# Patient Record
Sex: MALE | Race: WHITE | NOT HISPANIC OR LATINO | URBAN - METROPOLITAN AREA
[De-identification: names, ages, dates, MRNs, and addresses within clinical notes are randomized per-mention and may not be internally consistent; named-entity substitution may affect disease eponyms.]

---

## 2023-11-25 ENCOUNTER — HOSPITAL ENCOUNTER (EMERGENCY)
Facility: HOSPITAL | Age: 23
Discharge: HOME | End: 2023-11-25
Attending: EMERGENCY MEDICINE

## 2023-11-25 VITALS
DIASTOLIC BLOOD PRESSURE: 88 MMHG | HEIGHT: 74 IN | SYSTOLIC BLOOD PRESSURE: 129 MMHG | RESPIRATION RATE: 20 BRPM | WEIGHT: 171.31 LBS | TEMPERATURE: 98 F | BODY MASS INDEX: 21.98 KG/M2 | HEART RATE: 64 BPM | OXYGEN SATURATION: 98 %

## 2023-11-25 DIAGNOSIS — K82.8 GALLBLADDER SLUDGE: Primary | ICD-10-CM

## 2023-11-25 DIAGNOSIS — R10.11 RIGHT UPPER QUADRANT ABDOMINAL PAIN: ICD-10-CM

## 2023-11-25 DIAGNOSIS — R11.2 NAUSEA AND VOMITING, UNSPECIFIED VOMITING TYPE: ICD-10-CM

## 2023-11-25 DIAGNOSIS — R03.0 ELEVATED BLOOD PRESSURE READING WITHOUT DIAGNOSIS OF HYPERTENSION: ICD-10-CM

## 2023-11-25 LAB
ALBUMIN SERPL BCP-MCNC: 5.1 G/DL (ref 3.5–5.2)
ALP SERPL-CCNC: 54 U/L (ref 55–135)
ALT SERPL W/O P-5'-P-CCNC: 18 U/L (ref 10–44)
ANION GAP SERPL CALC-SCNC: 13 MMOL/L (ref 8–16)
AST SERPL-CCNC: 20 U/L (ref 10–40)
BASOPHILS # BLD AUTO: 0.03 K/UL (ref 0–0.2)
BASOPHILS NFR BLD: 0.3 % (ref 0–1.9)
BILIRUB SERPL-MCNC: 1.5 MG/DL (ref 0.1–1)
BUN SERPL-MCNC: 7 MG/DL (ref 6–20)
CALCIUM SERPL-MCNC: 10.3 MG/DL (ref 8.7–10.5)
CHLORIDE SERPL-SCNC: 101 MMOL/L (ref 95–110)
CO2 SERPL-SCNC: 28 MMOL/L (ref 23–29)
CREAT SERPL-MCNC: 0.9 MG/DL (ref 0.5–1.4)
DIFFERENTIAL METHOD: ABNORMAL
EOSINOPHIL # BLD AUTO: 0.2 K/UL (ref 0–0.5)
EOSINOPHIL NFR BLD: 2.3 % (ref 0–8)
ERYTHROCYTE [DISTWIDTH] IN BLOOD BY AUTOMATED COUNT: 11.8 % (ref 11.5–14.5)
EST. GFR  (NO RACE VARIABLE): >60 ML/MIN/1.73 M^2
GLUCOSE SERPL-MCNC: 120 MG/DL (ref 70–110)
HCT VFR BLD AUTO: 47.3 % (ref 40–54)
HGB BLD-MCNC: 16.8 G/DL (ref 14–18)
IMM GRANULOCYTES # BLD AUTO: 0.02 K/UL (ref 0–0.04)
IMM GRANULOCYTES NFR BLD AUTO: 0.2 % (ref 0–0.5)
LIPASE SERPL-CCNC: 19 U/L (ref 4–60)
LYMPHOCYTES # BLD AUTO: 1.5 K/UL (ref 1–4.8)
LYMPHOCYTES NFR BLD: 15.9 % (ref 18–48)
MAGNESIUM SERPL-MCNC: 1.9 MG/DL (ref 1.6–2.6)
MCH RBC QN AUTO: 30.3 PG (ref 27–31)
MCHC RBC AUTO-ENTMCNC: 35.5 G/DL (ref 32–36)
MCV RBC AUTO: 85 FL (ref 82–98)
MONOCYTES # BLD AUTO: 0.6 K/UL (ref 0.3–1)
MONOCYTES NFR BLD: 6.3 % (ref 4–15)
NEUTROPHILS # BLD AUTO: 6.9 K/UL (ref 1.8–7.7)
NEUTROPHILS NFR BLD: 75 % (ref 38–73)
NRBC BLD-RTO: 0 /100 WBC
PLATELET # BLD AUTO: 177 K/UL (ref 150–450)
PMV BLD AUTO: 10 FL (ref 9.2–12.9)
POTASSIUM SERPL-SCNC: 3.4 MMOL/L (ref 3.5–5.1)
PROT SERPL-MCNC: 8.2 G/DL (ref 6–8.4)
RBC # BLD AUTO: 5.54 M/UL (ref 4.6–6.2)
SODIUM SERPL-SCNC: 142 MMOL/L (ref 136–145)
WBC # BLD AUTO: 9.25 K/UL (ref 3.9–12.7)

## 2023-11-25 PROCEDURE — 96375 TX/PRO/DX INJ NEW DRUG ADDON: CPT

## 2023-11-25 PROCEDURE — 96376 TX/PRO/DX INJ SAME DRUG ADON: CPT

## 2023-11-25 PROCEDURE — 85025 COMPLETE CBC W/AUTO DIFF WBC: CPT | Performed by: EMERGENCY MEDICINE

## 2023-11-25 PROCEDURE — 83735 ASSAY OF MAGNESIUM: CPT | Performed by: EMERGENCY MEDICINE

## 2023-11-25 PROCEDURE — 80053 COMPREHEN METABOLIC PANEL: CPT | Performed by: EMERGENCY MEDICINE

## 2023-11-25 PROCEDURE — 25000003 PHARM REV CODE 250: Performed by: EMERGENCY MEDICINE

## 2023-11-25 PROCEDURE — 83690 ASSAY OF LIPASE: CPT | Performed by: EMERGENCY MEDICINE

## 2023-11-25 PROCEDURE — 63600175 PHARM REV CODE 636 W HCPCS: Performed by: EMERGENCY MEDICINE

## 2023-11-25 PROCEDURE — 99284 EMERGENCY DEPT VISIT MOD MDM: CPT | Mod: 25

## 2023-11-25 PROCEDURE — 96374 THER/PROPH/DIAG INJ IV PUSH: CPT

## 2023-11-25 RX ORDER — MAG HYDROX/ALUMINUM HYD/SIMETH 200-200-20
30 SUSPENSION, ORAL (FINAL DOSE FORM) ORAL ONCE
Status: COMPLETED | OUTPATIENT
Start: 2023-11-25 | End: 2023-11-25

## 2023-11-25 RX ORDER — ONDANSETRON 2 MG/ML
4 INJECTION INTRAMUSCULAR; INTRAVENOUS
Status: COMPLETED | OUTPATIENT
Start: 2023-11-25 | End: 2023-11-25

## 2023-11-25 RX ORDER — LIDOCAINE HYDROCHLORIDE 20 MG/ML
15 SOLUTION OROPHARYNGEAL ONCE
Status: DISCONTINUED | OUTPATIENT
Start: 2023-11-25 | End: 2023-11-25 | Stop reason: HOSPADM

## 2023-11-25 RX ORDER — ONDANSETRON 2 MG/ML
4 INJECTION INTRAMUSCULAR; INTRAVENOUS
Status: DISCONTINUED | OUTPATIENT
Start: 2023-11-25 | End: 2023-11-25 | Stop reason: HOSPADM

## 2023-11-25 RX ORDER — SODIUM CHLORIDE 9 MG/ML
INJECTION, SOLUTION INTRAVENOUS CONTINUOUS
Status: DISCONTINUED | OUTPATIENT
Start: 2023-11-25 | End: 2023-11-25

## 2023-11-25 RX ORDER — MORPHINE SULFATE 4 MG/ML
4 INJECTION, SOLUTION INTRAMUSCULAR; INTRAVENOUS
Status: COMPLETED | OUTPATIENT
Start: 2023-11-25 | End: 2023-11-25

## 2023-11-25 RX ORDER — HYDROCODONE BITARTRATE AND ACETAMINOPHEN 10; 325 MG/1; MG/1
1 TABLET ORAL
Status: COMPLETED | OUTPATIENT
Start: 2023-11-25 | End: 2023-11-25

## 2023-11-25 RX ORDER — ONDANSETRON 4 MG/1
4 TABLET, ORALLY DISINTEGRATING ORAL EVERY 8 HOURS PRN
Qty: 10 TABLET | Refills: 0 | Status: SHIPPED | OUTPATIENT
Start: 2023-11-25

## 2023-11-25 RX ORDER — HYDROCODONE BITARTRATE AND ACETAMINOPHEN 5; 325 MG/1; MG/1
1 TABLET ORAL EVERY 4 HOURS PRN
Qty: 12 TABLET | Refills: 0 | Status: SHIPPED | OUTPATIENT
Start: 2023-11-25

## 2023-11-25 RX ADMIN — MORPHINE SULFATE 4 MG: 4 INJECTION INTRAVENOUS at 02:11

## 2023-11-25 RX ADMIN — ALUMINUM HYDROXIDE, MAGNESIUM HYDROXIDE, AND SIMETHICONE 30 ML: 200; 200; 20 SUSPENSION ORAL at 06:11

## 2023-11-25 RX ADMIN — MORPHINE SULFATE 4 MG: 4 INJECTION INTRAVENOUS at 07:11

## 2023-11-25 RX ADMIN — HYDROCODONE BITARTRATE AND ACETAMINOPHEN 1 TABLET: 10; 325 TABLET ORAL at 08:11

## 2023-11-25 RX ADMIN — MORPHINE SULFATE 4 MG: 4 INJECTION INTRAVENOUS at 09:11

## 2023-11-25 RX ADMIN — POTASSIUM BICARBONATE 20 MEQ: 391 TABLET, EFFERVESCENT ORAL at 09:11

## 2023-11-25 RX ADMIN — ONDANSETRON 4 MG: 2 INJECTION INTRAMUSCULAR; INTRAVENOUS at 06:11

## 2023-11-25 NOTE — ED NOTES
Called again to check ETA of pt's MRCP: 15min. Pt. C/o pain (RUQ) and requesting pain medication - MD notified, will address new order.

## 2023-11-25 NOTE — ED NOTES
Pt. C/o severe RUQ pain. MD notified. New order received and addressed (MAR). Family member at BS. Denies further needs at this time. WCTM.

## 2023-11-25 NOTE — ED NOTES
"Called MRI - no answer. Called main radiology desk to make sure that MRI had been notified about MRCP for this pt. [facility] tech states, "They already have him moved over in the computer, so without question, they know about it." Pt. Resting quietly on stretcher in NAD. VSS, resp. E/u. Eyes closed, but arouses easily to voice. Denies needs. WCTM.  "

## 2023-11-25 NOTE — ED NOTES
Video  services utilized (ID: [id number]) to explain MRCP procedure to pt. And reason for procedure. Pt. Verbalizes understanding and consent to have procedure.

## 2023-11-25 NOTE — ED PROVIDER NOTES
SCRIBE #1 NOTE: I, Jojo Jett, am scribing for, and in the presence of, Adilson Lenz MD. I have scribed the HPI, ROS, and PEx.     SCRIBE #2 NOTE: I, Jaclyn Aguirre, am scribing for, and in the presence of,  Sam Lee MD. I have scribed the remaining portions of the note not scribed by Scribe #1.     SCRIBE #3 NOTE: I, Edie Waldo, am scribing for, and in the presence of, Keila Goodman DO. I have scribed the remaining portions of the note not scribed by Scribe #2.      History     Chief Complaint   Patient presents with    Abdominal Pain     Review of patient's allergies indicates:  No Known Allergies      History of Present Illness     HPI    11/25/2023, 5:52 AM  History obtained from the patient      History of Present Illness: Kaci Sood is a 23 y.o. male patient who presents to the Emergency Department for evaluation of epigastric abd pain which onset 2 days PTA. Pt says that he ate fried chicken the night before waking up with epigastric abd pain. Symptoms are constant and moderate in severity. No mitigating or exacerbating factors reported. Associated sxs include a subjective fever, chills, and sleep disturbances. Patient denies any N/V/D, CP, SOB, HA, and all other sxs at this time. No prior Tx. Pt occasionally drinks EtOH. No further complaints or concerns at this time.       Arrival mode: Personal vehicle     PCP: No primary care provider on file.        Past Medical History:  No past medical history on file.    Past Surgical History:  No past surgical history on file.      Family History:  No family history on file.    Social History:  Social History     Tobacco Use    Smoking status: Not on file    Smokeless tobacco: Not on file   Substance and Sexual Activity    Alcohol use: Not on file    Drug use: Not on file    Sexual activity: Not on file        Review of Systems     Review of Systems   Constitutional:  Positive for chills and fever (subjective).   HENT:   Negative for sore throat.    Respiratory:  Negative for shortness of breath.    Cardiovascular:  Negative for chest pain.   Gastrointestinal:  Positive for abdominal pain (epigastric). Negative for diarrhea, nausea and vomiting.   Genitourinary:  Negative for dysuria.   Musculoskeletal:  Negative for back pain.   Skin:  Negative for rash.   Neurological:  Negative for weakness and headaches.   Hematological:  Does not bruise/bleed easily.   Psychiatric/Behavioral:  Positive for sleep disturbance.    All other systems reviewed and are negative.     Physical Exam     Initial Vitals [11/25/23 0525]   BP Pulse Resp Temp SpO2   (!) 138/102 100 18 98.2 °F (36.8 °C) 100 %      MAP       --          Physical Exam  Nursing Notes and Vital Signs Reviewed.  Constitutional: Patient is in no acute distress. Well-developed and well-nourished.  Head: Atraumatic. Normocephalic.  Eyes: PERRL. EOM intact. Conjunctivae are not pale. No scleral icterus.  ENT: Mucous membranes are moist.   Neck: Supple. Full ROM. No lymphadenopathy.  Cardiovascular: Regular rate. Regular rhythm. No murmurs, rubs, or gallops. Distal pulses are 2+ and symmetric.  Pulmonary/Chest: No respiratory distress. Clear to auscultation bilaterally. No wheezing or rales.  Abdominal: Soft and non-distended.  There is no tenderness.  No rebound, guarding, or rigidity.   Genitourinary: No CVA tenderness  Musculoskeletal: Moves all extremities. No obvious deformities. No edema.   Skin: Warm and dry.  Neurological:  Alert, awake, and appropriate.  Normal speech.  No acute focal neurological deficits are appreciated.  Psychiatric: Normal affect. Good eye contact. Appropriate in content.     ED Course   Procedures  ED Vital Signs:  Vitals:    11/25/23 0903 11/25/23 0905 11/25/23 0920 11/25/23 0932   BP: 114/65   126/70   Pulse: 63  60 (!) 58   Resp:  20 16    Temp:       TempSrc:       SpO2: 95%      Weight:       Height:        11/25/23 1032 11/25/23 1132 11/25/23 1202  11/25/23 1232   BP: 118/65 130/73 128/79 116/69   Pulse: (!) 56 (!) 57 60 (!) 58   Resp: 16      Temp:       TempSrc:       SpO2: 98% 96% 97% 95%   Weight:       Height:        11/25/23 1302 11/25/23 1448 11/25/23 1510 11/25/23 1802   BP: 119/78 129/73  118/60   Pulse: 61 75 66 62   Resp: 16 18 16 16   Temp:   98 °F (36.7 °C)    TempSrc:   Oral    SpO2: 97% 99%  98%   Weight:       Height:        11/25/23 1833 11/25/23 1900 11/2001   BP: 139/86 129/88    Pulse: 63 64    Resp:   20   Temp:      TempSrc:      SpO2: 97% 98%    Weight:      Height:          Abnormal Lab Results:  Labs Reviewed   CBC W/ AUTO DIFFERENTIAL - Abnormal; Notable for the following components:       Result Value    Gran % 75.0 (*)     Lymph % 15.9 (*)     All other components within normal limits   COMPREHENSIVE METABOLIC PANEL - Abnormal; Notable for the following components:    Potassium 3.4 (*)     Glucose 120 (*)     Total Bilirubin 1.5 (*)     Alkaline Phosphatase 54 (*)     All other components within normal limits   LIPASE   MAGNESIUM   MAGNESIUM        All Lab Results:  Results for orders placed or performed during the hospital encounter of 11/25/23   CBC auto differential   Result Value Ref Range    WBC 9.25 3.90 - 12.70 K/uL    RBC 5.54 4.60 - 6.20 M/uL    Hemoglobin 16.8 14.0 - 18.0 g/dL    Hematocrit 47.3 40.0 - 54.0 %    MCV 85 82 - 98 fL    MCH 30.3 27.0 - 31.0 pg    MCHC 35.5 32.0 - 36.0 g/dL    RDW 11.8 11.5 - 14.5 %    Platelets 177 150 - 450 K/uL    MPV 10.0 9.2 - 12.9 fL    Immature Granulocytes 0.2 0.0 - 0.5 %    Gran # (ANC) 6.9 1.8 - 7.7 K/uL    Immature Grans (Abs) 0.02 0.00 - 0.04 K/uL    Lymph # 1.5 1.0 - 4.8 K/uL    Mono # 0.6 0.3 - 1.0 K/uL    Eos # 0.2 0.0 - 0.5 K/uL    Baso # 0.03 0.00 - 0.20 K/uL    nRBC 0 0 /100 WBC    Gran % 75.0 (H) 38.0 - 73.0 %    Lymph % 15.9 (L) 18.0 - 48.0 %    Mono % 6.3 4.0 - 15.0 %    Eosinophil % 2.3 0.0 - 8.0 %    Basophil % 0.3 0.0 - 1.9 %    Differential Method Automated     Comprehensive metabolic panel   Result Value Ref Range    Sodium 142 136 - 145 mmol/L    Potassium 3.4 (L) 3.5 - 5.1 mmol/L    Chloride 101 95 - 110 mmol/L    CO2 28 23 - 29 mmol/L    Glucose 120 (H) 70 - 110 mg/dL    BUN 7 6 - 20 mg/dL    Creatinine 0.9 0.5 - 1.4 mg/dL    Calcium 10.3 8.7 - 10.5 mg/dL    Total Protein 8.2 6.0 - 8.4 g/dL    Albumin 5.1 3.5 - 5.2 g/dL    Total Bilirubin 1.5 (H) 0.1 - 1.0 mg/dL    Alkaline Phosphatase 54 (L) 55 - 135 U/L    AST 20 10 - 40 U/L    ALT 18 10 - 44 U/L    eGFR >60 >60 mL/min/1.73 m^2    Anion Gap 13 8 - 16 mmol/L   Lipase   Result Value Ref Range    Lipase 19 4 - 60 U/L   Magnesium   Result Value Ref Range    Magnesium 1.9 1.6 - 2.6 mg/dL        Imaging Results:  Imaging Results              MRI MRCP Without Contrast (Final result)  Result time 11/25/23 18:26:21      Final result by Sterling Galvan MD (11/25/23 18:26:21)                   Impression:      Normal MRCP.    Finalized on: 11/25/2023 6:26 PM By:  SHERRI Galvan MD, MD  [facility]# [id number]      2023-11-25 18:28:32.402    [facility]               Narrative:    EXAM: MRI ABDOMEN WITHOUT CONTRAST MRCP    CLINICAL HISTORY: Biliary obstruction.    TECHNIQUE: Multiplanar, multisequence MR imaging was performed through the abdomen without intravenous contrast.  Two-dimensional and three-dimensional time-of-flight imaging was performed through the biliary tree.    COMPARISON:  None available    FINDINGS:    No biliary ductal dilatation.  No intraductal filling defects.  No ascites.  The gallbladder is grossly normal.                                         US Abdomen Limited (Final result)  Result time 11/25/23 08:08:45      Final result by Eliecer Harman III, MD (11/25/23 08:08:45)                   Impression:      Gallbladder sludge.      Electronically signed by: Joey Harman  Date:    11/25/2023  Time:    08:08               Narrative:    EXAMINATION:  US ABDOMEN LIMITED    CLINICAL HISTORY:  Abdominal pain,  elevated bili;    TECHNIQUE:  Limited ultrasound of the right upper quadrant of the abdomen (including pancreas, liver, gallbladder, common bile duct, and spleen) was performed.    COMPARISON:  None.    FINDINGS:  Liver: Normal in size, measuring 16.1 cm. Homogeneous echotexture. No focal hepatic lesions.    Gallbladder: Gallbladder sludge.  No stones identified.  No gallbladder wall thickening or pericholecystic fluid.  No sonographic Lundberg sign.    Biliary system: The common duct is not dilated, measuring 3 mm.  No intrahepatic ductal dilatation.    Miscellaneous: No free fluid.  The visualized pancreas, IVC and right kidney are unremarkable.  The right kidney measures 10.5 cm.  Hepatopetal flow within the portal vein.                                                The Emergency Provider reviewed the vital signs and test results, which are outlined above.     ED Discussion     6:00 AM: Dr. Lenz transfers care of patient to Dr. Lee pending lab results.    9:14 AM: Discussed pt's case with Dr. Kirby Lam MD (Bariatrics) who recommends having pt call his office to schedule repeat labs, and to order MRCP.     4:00 PM: Dr. Lee transfers care of patient to Dr. Goodman.    6:35 PM: Reassessed pt at this time. Pain and nausea are controlled.     7:20 PM: Discussed pt's case with Dr. Kirby Lam MD (General Surgery) who states that pt does not have signs of acute cholecystitis and that pt should be able to f/u outpatient to further discuss cholecystectomy.     7:20 PM: Reassessed pt at this time. Pt is okay with discharge with symptom control and follow-up. Discussed with pt all pertinent ED information and results. Discussed pt dx and plan of tx. Gave pt all f/u and return to the ED instructions. All questions and concerns were addressed at this time. Pt expresses understanding of information and instructions, and is comfortable with plan to discharge. Pt is stable for discharge.    I discussed with patient  and/or family/caretaker that evaluation in the ED does not suggest any emergent or life threatening medical conditions requiring immediate intervention beyond what was provided in the ED, and I believe patient is safe for discharge.  Regardless, an unremarkable evaluation in the ED does not preclude the development or presence of a serious of life threatening condition. As such, patient was instructed to return immediately for any worsening or change in current symptoms.        ED Course as of 11/26/23 0056   Sat Nov 25, 2023   1630 MRI MRCP Without Contrast  Pending  [NF]   1834 MRCP normal [NF]      ED Course User Index  [NF] Keila Goodman, DO     Medical Decision Making  23-year-old male presents with epigastric abdominal pain after eating fried chicken.  He has a mild increase in his total bilirubin but otherwise his LFTs are normal.  He is no fever or leukocytosis.  Lipase is negative for acute pancreatitis.  He is mild hypokalemia with otherwise normal electrolytes and renal function.  Ultrasound shows gallbladder sludge.  MRCP shows no signs of choledocholithiasis.  Pain is well-controlled and he is tolerating oral intake.      Amount and/or Complexity of Data Reviewed  Labs: ordered. Decision-making details documented in ED Course.  Radiology: ordered. Decision-making details documented in ED Course.    Risk  OTC drugs.  Prescription drug management.  Parenteral controlled substances.  Decision regarding hospitalization.  Minor surgery with no identified risk factors.                ED Medication(s):  Medications   aluminum-magnesium hydroxide-simethicone 200-200-20 mg/5 mL suspension 30 mL (30 mLs Oral Given 11/25/23 0614)   ondansetron injection 4 mg (4 mg Intravenous Given 11/25/23 0625)   morphine injection 4 mg (4 mg Intravenous Given 11/25/23 0708)   morphine injection 4 mg (4 mg Intravenous Given 11/25/23 0905)   potassium bicarbonate disintegrating tablet 20 mEq (20 mEq Oral Given 11/25/23 0907)    morphine injection 4 mg (4 mg Intravenous Given 11/25/23 1448)   HYDROcodone-acetaminophen  mg per tablet 1 tablet (1 tablet Oral Given 11/2001)       Discharge Medication List as of 11/25/2023  7:20 PM        START taking these medications    Details   HYDROcodone-acetaminophen (NORCO) 5-325 mg per tablet Take 1 tablet by mouth every 4 (four) hours as needed for Pain., Starting Sat 11/25/2023, Print      ondansetron (ZOFRAN-ODT) 4 MG TbDL Take 1 tablet (4 mg total) by mouth every 8 (eight) hours as needed (Nausea)., Starting Sat 11/25/2023, Print              Follow-up Information       [facility] Emergency [facility]    Specialty: Emergency Medicine  Why: As needed, If symptoms worsen  Contact information:  [id number] [facility] [address] [phone]                               Scribe Attestation:   Scribe #1: I performed the above scribed service and the documentation accurately describes the services I performed. I attest to the accuracy of the note.     Attending:   Physician Attestation Statement for Scribe #1: I, Adilson Lenz MD, personally performed the services described in this documentation, as scribed by Jojo Jett, in my presence, and it is both accurate and complete.       Scribe Attestation:   Scribe #2: I performed the above scribed service and the documentation accurately describes the services I performed. I attest to the accuracy of the note.  Scribe #3: I performed the above scribed service and the documentation accurately describes the services I performed. I attest to the accuracy of the note.    Attending Attestation:           Physician Attestation for Scribe:    Physician Attestation Statement for Scribe #2: I, Sam Lee MD, reviewed documentation, as scribed by Jaclyn Aguirre in my presence, and it is both accurate and complete. I also acknowledge and confirm the content of the note done by Scribe #1.      Attending:   Physician  Attestation Statement for Scribe #3: I, Keila Goodman DO, personally performed the services described in this documentation, as scribed by Edie Haas, in my presence, and it is both accurate and complete.        Clinical Impression       ICD-10-CM ICD-9-CM   1. Gallbladder sludge  K82.8 575.8   2. Nausea and vomiting, unspecified vomiting type  R11.2 787.01   3. Elevated blood pressure reading without diagnosis of hypertension  R03.0 796.2   4. Right upper quadrant abdominal pain  R10.11 789.01       Disposition:   Disposition: Discharged  Condition: Stable         Keila Goodman DO  11/26/23 0056

## 2023-11-25 NOTE — ED NOTES
Pt. Resting quietly on stretcher with eyes closed, but arouses easily to voice. Light off in pt. Room per pt. Request. NAD, VSS, resp. E/u. Awaiting MRI. Denies needs. WCTM.

## 2023-11-25 NOTE — ED NOTES
Video Intepreter services utilized by MD to explain test results and treatment options to pt. (ID: [id number]).

## 2023-11-25 NOTE — ED NOTES
Pt. C/o severe RUQ pain and requesting pain medication to address. MD notified, new order received and addressed. Pt. Made aware of ETA of MRI. Denies further needs at this time. VSS, resp. E/u. AAOx4. WCTM.

## 2023-11-25 NOTE — ED NOTES
Pt. Ambulating to restroom. AAOx4. NAD, VSS, resp. E/u. Denies need for pain medication at this time. WCTM.

## 2023-11-26 NOTE — ED NOTES
Pt's family member (at ) asking if pt. Can eat now. Explained that pt. Cannot eat until MRCP results in case surgery deemed necessary tonight. Family member verbalizes understanding. Denies further needs. Pt. Sitting up on stretcher in NAD. VSS, resp. E/u. AAOx4. WCTM.
